# Patient Record
Sex: MALE | URBAN - METROPOLITAN AREA
[De-identification: names, ages, dates, MRNs, and addresses within clinical notes are randomized per-mention and may not be internally consistent; named-entity substitution may affect disease eponyms.]

---

## 2019-03-01 ENCOUNTER — IMPORTED ENCOUNTER (OUTPATIENT)
Dept: URBAN - METROPOLITAN AREA CLINIC 9 | Facility: CLINIC | Age: 69
End: 2019-03-01

## 2020-10-14 NOTE — PATIENT DISCUSSION
Continue: prednisolone acetate (prednisolone acetate): drops,suspension: 1% 1 drop four times a day into left eye 08-

## 2020-10-20 NOTE — PATIENT DISCUSSION
Continue: prednisolone acetate (prednisolone acetate): drops,suspension: 1% 1 drop four times a day into right eye 08-

## 2020-11-04 NOTE — PATIENT DISCUSSION
Continue: prednisolone acetate (prednisolone acetate): drops,suspension: 1% 1 drop three times a day into right eye 08-

## 2021-01-20 NOTE — PATIENT DISCUSSION
Continue: Refresh Optive Arnie-3 ([id number]): dropperette: 0.5-1-0.5% 1 drop single dose as needed into both eyes

## 2021-10-16 ASSESSMENT — KERATOMETRY
OD_AXISANGLE_DEGREES: 94
OS_AXISANGLE_DEGREES: 87
OD_K1POWER_DIOPTERS: 44
OS_K1POWER_DIOPTERS: 43.75
OS_AXISANGLE2_DEGREES: 177
OS_K2POWER_DIOPTERS: 45.75
OD_AXISANGLE2_DEGREES: 4
OD_K2POWER_DIOPTERS: 45.5

## 2021-10-16 ASSESSMENT — TONOMETRY
OS_IOP_MMHG: 14
OD_IOP_MMHG: 13

## 2021-10-16 ASSESSMENT — VISUAL ACUITY
OS_CC: 20/20 SN
OD_SC: 20/40 SN
OD_CC: 20/20 SN
OS_SC: 20/20 SN

## 2022-01-01 RX ORDER — ASPIRIN 81 MG/1
TABLET ORAL
COMMUNITY

## 2022-01-01 RX ORDER — PANTOPRAZOLE SODIUM 40 MG/1
TABLET, DELAYED RELEASE ORAL
COMMUNITY

## 2022-01-01 RX ORDER — IRBESARTAN AND HYDROCHLOROTHIAZIDE 150; 12.5 MG/1; MG/1
TABLET, FILM COATED ORAL
COMMUNITY

## 2022-01-01 RX ORDER — CYPROHEPTADINE HYDROCHLORIDE 4 MG/1
TABLET ORAL
COMMUNITY
Start: 2021-06-21

## 2022-01-01 RX ORDER — AZITHROMYCIN 250 MG/1
TABLET, FILM COATED ORAL
COMMUNITY

## 2022-07-22 PROBLEM — F32.9 REACTIVE DEPRESSION: Status: ACTIVE | Noted: 2022-01-01

## 2022-07-22 PROBLEM — G25.81 RESTLESS LEG SYNDROME: Status: ACTIVE | Noted: 2022-01-01

## 2022-07-22 PROBLEM — E78.2 MIXED HYPERLIPIDEMIA: Status: ACTIVE | Noted: 2022-01-01

## 2022-07-22 PROBLEM — I25.10 ATHEROSCLEROTIC HEART DISEASE OF NATIVE CORONARY ARTERY WITHOUT ANGINA PECTORIS: Status: ACTIVE | Noted: 2022-01-01

## 2022-07-22 PROBLEM — E83.51 LOW CALCIUM LEVELS: Status: ACTIVE | Noted: 2022-01-01

## 2022-07-22 PROBLEM — F17.200 SMOKING: Status: ACTIVE | Noted: 2022-01-01

## 2022-07-22 PROBLEM — G44.52 NEW DAILY PERSISTENT HEADACHE: Status: ACTIVE | Noted: 2022-01-01

## 2022-07-22 PROBLEM — I73.9 PERIPHERAL VASCULAR DISEASE (HCC): Status: ACTIVE | Noted: 2022-01-01

## 2022-07-22 PROBLEM — M19.90 ARTHRITIS: Status: ACTIVE | Noted: 2022-01-01

## 2022-07-22 PROBLEM — I65.1 BASILAR ARTERY STENOSIS: Status: ACTIVE | Noted: 2022-01-01

## 2022-07-22 PROBLEM — I10 ESSENTIAL HYPERTENSION: Status: ACTIVE | Noted: 2022-01-01
